# Patient Record
Sex: FEMALE | Race: WHITE | NOT HISPANIC OR LATINO | Employment: FULL TIME | URBAN - METROPOLITAN AREA
[De-identification: names, ages, dates, MRNs, and addresses within clinical notes are randomized per-mention and may not be internally consistent; named-entity substitution may affect disease eponyms.]

---

## 2018-04-27 ENCOUNTER — OFFICE VISIT - HEALTHEAST (OUTPATIENT)
Dept: FAMILY MEDICINE | Facility: CLINIC | Age: 63
End: 2018-04-27

## 2018-04-27 DIAGNOSIS — R10.2 PELVIC PAIN: ICD-10-CM

## 2018-04-27 LAB
ALBUMIN UR-MCNC: NEGATIVE MG/DL
APPEARANCE UR: CLEAR
BACTERIA #/AREA URNS HPF: ABNORMAL HPF
BILIRUB UR QL STRIP: NEGATIVE
CLUE CELLS: NORMAL
COLOR UR AUTO: YELLOW
GLUCOSE UR STRIP-MCNC: NEGATIVE MG/DL
HGB UR QL STRIP: NEGATIVE
KETONES UR STRIP-MCNC: NEGATIVE MG/DL
LEUKOCYTE ESTERASE UR QL STRIP: ABNORMAL
NITRATE UR QL: NEGATIVE
PH UR STRIP: 6 [PH] (ref 5–8)
RBC #/AREA URNS AUTO: ABNORMAL HPF
SP GR UR STRIP: <=1.005 (ref 1–1.03)
SQUAMOUS #/AREA URNS AUTO: ABNORMAL LPF
TRICHOMONAS, WET PREP: NORMAL
UROBILINOGEN UR STRIP-ACNC: ABNORMAL
WBC #/AREA URNS AUTO: ABNORMAL HPF
YEAST, WET PREP: NORMAL

## 2018-04-28 LAB — BACTERIA SPEC CULT: NO GROWTH

## 2018-05-02 ENCOUNTER — OFFICE VISIT (OUTPATIENT)
Dept: OBGYN | Facility: CLINIC | Age: 63
End: 2018-05-02
Payer: COMMERCIAL

## 2018-05-02 VITALS
BODY MASS INDEX: 22.3 KG/M2 | TEMPERATURE: 98.7 F | WEIGHT: 134 LBS | DIASTOLIC BLOOD PRESSURE: 80 MMHG | SYSTOLIC BLOOD PRESSURE: 146 MMHG | HEART RATE: 69 BPM

## 2018-05-02 DIAGNOSIS — Z12.4 CERVICAL CANCER SCREENING: ICD-10-CM

## 2018-05-02 DIAGNOSIS — N84.1 CERVICAL POLYP: Primary | ICD-10-CM

## 2018-05-02 DIAGNOSIS — R10.2 PELVIC PAIN IN FEMALE: ICD-10-CM

## 2018-05-02 DIAGNOSIS — Z12.31 ENCOUNTER FOR SCREENING MAMMOGRAM FOR BREAST CANCER: ICD-10-CM

## 2018-05-02 PROCEDURE — 99203 OFFICE O/P NEW LOW 30 MIN: CPT | Mod: 25 | Performed by: OBSTETRICS & GYNECOLOGY

## 2018-05-02 PROCEDURE — 57500 BIOPSY OF CERVIX: CPT | Performed by: OBSTETRICS & GYNECOLOGY

## 2018-05-02 PROCEDURE — G0145 SCR C/V CYTO,THINLAYER,RESCR: HCPCS | Performed by: OBSTETRICS & GYNECOLOGY

## 2018-05-02 PROCEDURE — 88305 TISSUE EXAM BY PATHOLOGIST: CPT | Performed by: OBSTETRICS & GYNECOLOGY

## 2018-05-02 PROCEDURE — 87624 HPV HI-RISK TYP POOLED RSLT: CPT | Performed by: OBSTETRICS & GYNECOLOGY

## 2018-05-02 NOTE — PROGRESS NOTES
GYN Clinic Visit    Date of visit: 2018     Chief Complaint: pelvic pain    HPI:   Nicci Rinaldi is a 62 year female who presents to clinic today in consultation for pelvic pain and a cervical polyp as a referral from an outside urgent care.    Reports she's noticed some lower abdominal discomfort over the past few weeks/month.  It goes across her lower abdomen just below the umbilicus, and sometimes extends above the umbilicus.  States it's not constant, but isn't sure what makes it better or worse.  She does notice it more when she's falling asleep.  She describes it as a burning/itching/irritating sensation.      She is active, teaching, doing yoga and swimming, but these activites don't seem to have any impact.  Doesn't seem to be associated with eating.    Has regular bowel movements.  No urinary symptoms.    Vaginal discharge: denies  History of STIs: denies  Menses:  No LMP recorded. Patient is postmenopausal.   Sexually active: yes, consistent partner   Pain with intercourse: denies  Contraception: n/a   Constipation: denies  Urinary symptoms: negative  Previous imaging: none    Patient additionally has following concerns/questions today: was told she had a cervical polyp during exam in UC.    Medications:  Current Outpatient Prescriptions   Medication     aspirin 10 mg/mL SUSP     Cholecalciferol (VITAMIN D PO)     Cyanocobalamin (VITAMIN B-12 PO)     Fish Oil-Cholecalciferol (FISH OIL + D3 PO)     MetroNIDazole (METROCREAM) 0.75 % cream     Multiple Vitamin (DAILY MULTIVITAMIN PO)     naproxen (NAPROSYN) 250 MG tablet     tretinoin (RETIN-A) 0.1 % cream     No current facility-administered medications for this visit.        Allergy:  No Known Allergies  Patient denies food, latex or environmental allergies.     Obstetric History:   No obstetric history on file.   Obstetric History       T2      L2     SAB1   TAB0   Ectopic0   Multiple0   Live Births2       # Outcome Date GA Lbr  "Edmond/2nd Weight Sex Delivery Anes PTL Lv   3 SAB      SAB      2 Term         MARICHUY   1 Term         MARICHUY          Gynecologic History:  Menarche:   No LMP recorded. Patient is postmenopausal.  Menopause around age 56.  STI history: denies  Last Pap: \"many years ago\"  History of abnormal pap: denies  History of cervical procedures: denies     Past Medical History:  Past Medical History:   Diagnosis Date     Adenomatous polyp of colon        Past Surgical History:  Past Surgical History:   Procedure Laterality Date     GYN SURGERY      tubal ligation     REPAIR PTOSIS BILATERAL  2013    Procedure: REPAIR PTOSIS BILATERAL;  BILATERAL UPPER LID PTOSIS AND MECHANICAL PTOSIS REPAIR ;  Surgeon: Mikel Hartley MD;  Location: [facility]       Social History:  Tobacco use: denies  Alcohol use: social  Recreational drug use: denies  Relationship status is: .    Lives with   Employment: teacher  History of sexual, physical or mental abuse denies.   She feels safe in her current relationship.    History reviewed. No pertinent family history.  Denies hx of Breast, ovarian, or colon cancer, sudden death, early cardiovascular or thomboembolic disease. Denies hx or congenital anomalies and autoimmune diseases.    Review of Systems:  Review of Systems  Gen: no change in weight, no fever, no chills, no fatigue  CV: no palpitations, no chest pain, no hypertension, no syncope  Resp: no shortness of breath, no cough, no wheezing, no asthma  GI: no nausea, no vomiting, no diarrhea, no constipation, no bloating, no GERD  : +pelvic pain as above,  no vaginal discharge, no dysuria, no abnormal bleeding  Endo: no thyroid problems, no cold/heat intolerance, no acne, no hirsutism, no diabetes  Heme: no easy bruising or bleeding, no history of DVT/PE/CVA  Neuro: no headaches, no seizures, no strokes, no focal deficits      Physical Exam:  Vitals:    18 1308   BP: 146/80   Pulse: 69   Temp: 98.7  F (37.1  C) "   TempSrc: Oral   Weight: 134 lb (60.8 kg)     Body mass index is 22.3 kg/(m^2).    Gen: NAD, Awake and alert, cooperative with exam  Neuro:  Strength, sensation grossly intact  Psych:  Appropriate mood and affect  Abd: soft, nontender, nondistended, no rebound, no guarding.  Normoactive BS  Extremities: nontender, no edema  : normal appearing external genitalia, no lesions or abnormalities. Well supported urethra, normal Skenes and Bartholins. Intact normal appearing vaginal mucosa without lesions or abnormal discharge. Cervix appears WNL with 3mm polyp extending from cervical os.  Pap smear was obtained. Bimanual exam reveals normal size uterus, no palpable uterine or adnexal masses.    Assessment:  Nicci Rinaldi is a 62 year old  who presents in consultation for pelvic pain and cervical polyp.     Plan:  Cervical polyp removed without complication.  Pap smear obtained.    Exam benign.  Abdominal pain seems non-gynecologic in origin.  She was encouraged to see a primary care provider if the pain continues.    Order placed for mammogram.  Patient given scheduling info and was encouraged to schedule.  Reports she's got her last colonoscopy at outside facility and isn't sure that she's due yet.  She was encouraged to contact that facility to get her records and find out when she's due.    Georgia Colindres MD    20 min was spent with this patient that does not include time spent on the procedure and over 50 % of the 20 min was spent counseling on cervical polyps and their implications, as well as the importance of routine screening examinations (pap smear, mammogram, colonoscopy).

## 2018-05-02 NOTE — MR AVS SNAPSHOT
"              After Visit Summary   2018    Nicci Rinaldi    MRN: [id number]           Patient Information     Date Of Birth          1955        Visit Information        Provider Department      2018 1:00 PM Georgia Colindres MD [facility]        Today's Diagnoses     Cervical polyp    -  1    Pelvic pain in female        Encounter for screening mammogram for breast cancer        Cervical cancer screening           Follow-ups after your visit        Who to contact     If you have questions or need follow up information about today's clinic visit or your schedule please contact [facility] directly at [phone].  Normal or non-critical lab and imaging results will be communicated to you by MyChart, letter or phone within 4 business days after the clinic has received the results. If you do not hear from us within 7 days, please contact the clinic through [employer] or phone. If you have a critical or abnormal lab result, we will notify you by phone as soon as possible.  Submit refill requests through [employer] or call your pharmacy and they will forward the refill request to us. Please allow 3 business days for your refill to be completed.          Additional Information About Your Visit        MyChart Information     [employer] lets you send messages to your doctor, view your test results, renew your prescriptions, schedule appointments and more. To sign up, go to [email or web address] . Click on \"Log in\" on the left side of the screen, which will take you to the Welcome page. Then click on \"Sign up Now\" on the right side of the page.     You will be asked to enter the access code listed below, as well as some personal information. Please follow the directions to create your username and password.     Your access code is: [email or web address]  Expires: 2018  8:05 PM     Your access code will  in 90 days. If you need help or a new code, please call your [place] " clinic or [phone].        Care EveryWhere ID     This is your Care EveryWhere ID. This could be used by other organizations to access your [place] medical records  [id number]        Your Vitals Were     Pulse Temperature BMI (Body Mass Index)             69 98.7  F (37.1  C) (Oral) 22.3 kg/m2          Blood Pressure from Last 3 Encounters:   05/02/18 146/80   12/23/13 112/70    Weight from Last 3 Encounters:   05/02/18 134 lb (60.8 kg)   12/23/13 129 lb (58.5 kg)              We Performed the Following     BIOPSY/EXCIS CERVICAL LESION W/WO FULGURATION     Pap imaged thin layer screen with HPV - recommended age 30 - 65 years (select HPV order below)     Surgical pathology exam        Primary Care Provider Office Phone # Fax #    Gee THERESA Pratt [phone]       [place] [address]        Equal Access to Services     ROSA RUDOLPH : Hadii lucy ku hadasho Soomaali, waaxda luqadaha, qaybta kaalmada adeegyada, katty dash . So [facility] [phone].    ATENCIÓN: Si habla español, tiene a lewis disposición servicios gratuitos de asistencia lingüística. Llame al [phone].    We comply with applicable federal civil rights laws and [place] laws. We do not discriminate on the basis of race, color, national origin, age, disability, sex, sexual orientation, or gender identity.            Thank you!     Thank you for choosing [facility]  for your care. Our goal is always to provide you with excellent care. Hearing back from our patients is one way we can continue to improve our services. Please take a few minutes to complete the written survey that you may receive in the mail after your visit with us. Thank you!             Your Updated Medication List - Protect others around you: Learn how to safely use, store and throw away your medicines at www.disposemymeds.org.          This list is accurate as of 5/2/18 11:59  PM.  Always use your most recent med list.                   Brand Name Dispense Instructions for use Diagnosis    aspirin 10 mg/mL Susp      Take 81 mg by mouth daily        DAILY MULTIVITAMIN PO      Take  by mouth daily.        FISH OIL + D3 PO           metroNIDAZOLE 0.75 % cream    METROCREAM    45 g    Apply  topically. qam to entire face    Acne rosacea       naproxen 250 MG tablet    NAPROSYN     Take 250 mg by mouth daily as needed.        tretinoin 0.1 % cream    RETIN-A    45 g    Apply  topically At Bedtime. Use pea-size amount for entire face, use qohs or every 3rd night    Milia, [place]one       VITAMIN B-12 PO      Take  by mouth.        VITAMIN D PO      Take  by mouth.

## 2018-05-02 NOTE — LETTER
May 14, 2018    Nicci Rinaldi  [address] [address]    Dear Nicci,  We are happy to inform you that your PAP smear result from 05/02/18 is normal.  We are now able to do a follow up test on PAP smears. The DNA test is for HPV (Human Papilloma Virus). Cervical cancer is closely linked with certain types of HPV. Your results showed no evidence of high risk HPV.  Therefore we recommend you return in 3 years for your next pap smear.  You will still need to return to the clinic every year for an annual exam and other preventive tests.  Please contact the clinic at [phone] with any questions.  Sincerely,    Georgia Colindres MD/[facility]

## 2018-05-02 NOTE — LETTER
"May 15, 2018      Nicci Tapia  [address] [address]        Dear ,    We are writing to inform you of your test results.    The pathology on your polyp was normal and no further follow-up is needed.     Resulted Orders   Surgical pathology exam   Result Value Ref Range    Copath Report       Patient Name: NICCI TAPIA  MR#: [id number]  Specimen #: [id number]  Collected: 5/2/2018  Received: 5/3/2018  Reported: 5/6/2018 17:06  Ordering Phy(s): KEVEN ROLLE    For improved result formatting, select 'View Enhanced Report Format' under   Linked Documents section.    SPECIMEN(S):  Cervical polyp    FINAL DIAGNOSIS:  Cervical polyp, resection:  - Benign endocervical polyp with a few nabothian cysts.    Electronically signed out by:    Alexandre Calhoun M.D.    CLINICAL HISTORY:  62 year old female with cervical polyp and recent pap smear [id number] on   5/2/2018 pending.    GROSS:  A single specimen container with formalin is received labeled with the   patient's name, date of birth, and  medical record number. Information on the requisition slip, container, and   associated labels is confirmed.    The specimen is designated \"cervical polyp\" consisting of a 1.2 x 1 x 0.4   cm pink polyp.  The polyp is  bisected to reveal gelatinous, translucent material.  The specimen is    entirely submitted in one cassette.  (Dictated by: Marisabel Elizondo 5/3/2018 02:19 PM)    MICROSCOPIC:  Microscopic examination is performed.    CPT Codes:  A: 09346-VC2    TESTING LAB LOCATION:  [address] [address] [phone]    COLLECTION SITE:  Client: [facility]  Location: [facility] (B)     Pap imaged thin layer screen with HPV - recommended age 30 - 65 years (select HPV order below)   Result Value Ref Range    PAP NIL     Copath Report         Patient Name: NICCI TAPIA  MR#: " [id number]  Specimen #: [id number]  Collected: 5/2/2018  Received: 5/3/2018  Reported: 5/8/2018 09:55  Ordering Phy(s): KEVEN ROLLE    For improved result formatting, select 'View Enhanced Report Format' under   Linked Documents section.    SPECIMEN/STAIN PROCESS:  Pap imaged thin layer prep screening (Surepath, FocalPoint with guided   screening)       Pap-Cyto x 2, HPV ordered x 1    SOURCE: Cervical, endocervical  ----------------------------------------------------------------   Pap imaged thin layer prep screening (Surepath, FocalPoint with guided   screening)  SPECIMEN ADEQUACY:  Satisfactory for evaluation.  -Transitional zone component could not be determined due to atrophy.    CYTOLOGIC INTERPRETATION:    Negative for intraepithelial lesion or malignancy    Electronically signed out by:  JENNIFER Hyde (ASCP)    Processed and screened at [facility]    CLINICAL HIST ORY:    Post Menopausal,    Papanicolaou Test Limitations:  Cervical cytology is a screening test with   limited sensitivity; regular  screening is critical for cancer prevention; Pap tests are primarily   effective for the diagnosis/prevention of  squamous cell carcinoma, not adenocarcinomas or other cancers.    TESTING LAB LOCATION:  [address]  [phone]    COLLECTION SITE:  Client:  [facility]  Location: [facility] ([facility])     HPV High Risk Types DNA Cervical   Result Value Ref Range    HPV Source SurePath     HPV 16 DNA Negative NEG^Negative    HPV 18 DNA Negative NEG^Negative    Other HR HPV Negative NEG^Negative    Final Diagnosis This patient's sample is negative for HPV DNA.       Comment:      This test was developed and its performance characteristics determined by the   [facility], [facility]. It   has not been  cleared or approved by the FDA. The laboratory is regulated under   CLIA as qualified to perform high-complexity testing. This test is used for   clinical purposes. It should not be regarded as investigational or for   research.  (Note)  METHODOLOGY:  The Roche yoon 4800 system uses automated extraction,   simultaneous amplification of HPV (L1 region) and beta-globin,    followed by  real time detection of fluorescent labeled HPV and beta   globin using specific oligonucleotide probes . The test specifically   identifies types HPV 16 DNA and HPV 18 DNA while concurrently   detecting the rest of the high risk types (31, 33, 35, 39, 45, 51,   52, 56, 58, 59, 66 or 68).  COMMENTS:  This test is not intended for use as a screening device   for women under age 30 with normal cervical cytology.  Results should   be correl  ated with cytologic and histologic findings. Close clinical   followup is recommended.      Specimen Description Cervical Cells       Comment:      [id number]       If you have any questions or concerns, please call the clinic at the number listed above.       Sincerely,        Georgia Colindres MD

## 2018-05-02 NOTE — NURSING NOTE
"Chief Complaint   Patient presents with     RECHECK     cervical polp       Initial /80  Pulse 69  Temp 98.7  F (37.1  C) (Oral)  Wt 134 lb (60.8 kg)  BMI 22.3 kg/m2 Estimated body mass index is 22.3 kg/(m^2) as calculated from the following:    Height as of 12/23/13: 5' 5\" (1.651 m).    Weight as of this encounter: 134 lb (60.8 kg).  BP completed using cuff size: regular    No obstetric history on file.    The following HM Due: pap smear      The following patient reported/Care Every where data was sent to:  P ABSTRACT QUALITY INITIATIVES [[id number]]  VIOLETTE Pardo                 "

## 2018-05-06 LAB — COPATH REPORT: NORMAL

## 2018-05-08 LAB
COPATH REPORT: NORMAL
PAP: NORMAL

## 2018-05-08 NOTE — PROCEDURES
PROCEDURE  Procedure: Cervical polypectomy  Indication: cervical polyp  After discussion with the patient I recommended that the patient proceed with removal of cervical polyp.  The risks and benefits were discussed with the patient in detail. She understood the risks including infection and bleeding.  The patient was agreeable to proceed with polyp removal and written consent was obtained.   On speculum exam, the patient no abnormalities in her vaginal epithelium. Her cervix was easily visualized and a 3mm polyp was seen in the cervical os.  The polyp was grasped with a ring forceps and twisted until it was removed.  There was no bleeding after removal.    The patient tolerated the procedure without difficulty. No complications occurred during the biopsy.     Georgia Colindres MD

## 2018-05-09 LAB
FINAL DIAGNOSIS: NORMAL
HPV HR 12 DNA CVX QL NAA+PROBE: NEGATIVE
HPV16 DNA SPEC QL NAA+PROBE: NEGATIVE
HPV18 DNA SPEC QL NAA+PROBE: NEGATIVE
SPECIMEN DESCRIPTION: NORMAL
SPECIMEN SOURCE CVX/VAG CYTO: NORMAL

## 2019-11-19 ENCOUNTER — OFFICE VISIT - HEALTHEAST (OUTPATIENT)
Dept: FAMILY MEDICINE | Facility: CLINIC | Age: 64
End: 2019-11-19

## 2019-11-19 DIAGNOSIS — T88.1XXA LOCAL REACTION TO IMMUNIZATION, INITIAL ENCOUNTER: ICD-10-CM

## 2021-06-01 VITALS — WEIGHT: 134 LBS | BODY MASS INDEX: 22.3 KG/M2

## 2021-06-04 VITALS
OXYGEN SATURATION: 99 % | DIASTOLIC BLOOD PRESSURE: 64 MMHG | SYSTOLIC BLOOD PRESSURE: 122 MMHG | TEMPERATURE: 98.2 F | HEART RATE: 67 BPM | RESPIRATION RATE: 16 BRPM

## 2021-06-17 NOTE — PROGRESS NOTES
ASSESSMENT:   1. Pelvic pain  Urinalysis-UC if Indicated    Culture, Urine    Wet Prep, Vaginal       PLAN:  21-year-old female presents for evaluation of pelvic pain.  Been present for 1 month.  Not associated with dysuria, not associated with vaginal discharge or bleeding.  Exam today reveals a small mass through the cervical os.  Also noted excoriation of the cervix.  There is no adnexal tenderness, I do not appreciate an adnexal mass.  There is no abdominal tenderness to palpation.  UA is obtained and is not convincing for infection, however there are a few bacteria present so we will follow-up with culture.  Do not feel patient needs to start antibiotic treatment for this without urinary symptoms.  She will be contacted if urine culture results necessitate treatment.  Did obtain a wet prep as well, this does result is negative.  Patient is advised that she needs very close follow-up with gynecology.  She is provided with contact information for 2 separate clinics.  The importance of follow-up was stressed to her several times.  She will return to clinic or emergency department with severe worsening of pain, fevers, or any other concerns in the meantime.    I discussed red flag symptoms, return precautions to clinic/ER and follow up care with patient/parent.  Expected clinical course, symptomatic cares advised. Questions answered. Patient/parent amenable with plan.    Patient Instructions:  Patient Instructions   Your tests today are normal.  Your cervix exam is not normal today, and this needs close follow up with gynecology.  The [facility] can be reached at [phone].  Another option would be Dr. Keon Maguire at [facility] [phone].  Please make an appointment to be seen next week.      SUBJECTIVE:   Nicci Rinaldi is a 62 y.o. female who presents today with right sided pelvic pain for about one month.  Pain is intermittent.  Denies dysuria, does endorse some urinary frequency.  Denies hematuria.   "Denies fever.  No back pain.  No vaginal discharge or itching, but does have some tingling to the vaginal. No vaginal bleeding.  Has not had PAP in \"many years\".  Sexually active with same partner.  Denies vaginal lesions.  Previous tubal, no other abdominal surgeries.  No nausea, vomiting or diarrhea.  Has had more frequent BM over the last month, not loose.  Denies bloody stools or melena.  Notes pain is most prominent when her bladder is full.       ROS:  Comprehensive 12 pt ROS completed, positives noted in HPI, otherwise negative.      Past Medical History:  There is no problem list on file for this patient.      Surgical History:  No past surgical history on file.        Family History:  No family history on file.    Reviewed; Non-contributory    History   Smoking Status     Never Smoker   Smokeless Tobacco     Never Used       Current Medications:  No current outpatient prescriptions on file prior to visit.     No current facility-administered medications on file prior to visit.        Allergies:   No Known Allergies    OBJECTIVE:   Vitals:    04/27/18 1559   BP: 125/65   Pulse: 64   Resp: 15   Temp: 97  F (36.1  C)   TempSrc: Oral   SpO2: 98%   Weight: 134 lb (60.8 kg)     Physical exam reveals a pleasant 62 y.o. female.   Appears healthy, alert and cooperative. Non-toxic appearance.  Mouth:  Mucosa pink and moist.   Neck: normal  Lungs: Chest is clear, no wheezing, rhonchi or rales. Symmetric air entry throughout both lung fields.  Heart: regular rate and rhythm, no murmur, rub or gallop  Abdomen: soft, nontender. No masses or organomegaly  : Normal female external genitalia without lesions, rash, or erythema.  Vaginal mucosa pink and moist, rugae intact.  Vaginal discharge is scant.  Cervix with mild excoriation at the 12 o'clock position. There is an approximant 3cm reddened mass through the cervical os.  No cervical motion or adnexal tenderness.  Skin: pink, warm, dry, and without lesions on limited " skin exam. No rashes.     RADIOLOGY    none  LABORATORY STUDIES    Recent Results (from the past 24 hour(s))   Urinalysis-UC if Indicated   Result Value Ref Range    Color, UA Yellow Colorless, Yellow, Straw, Light Yellow    Clarity, UA Clear Clear    Glucose, UA Negative Negative    Bilirubin, UA Negative Negative    Ketones, UA Negative Negative    Specific Gravity, UA <=1.005 1.005 - 1.030    Blood, UA Negative Negative    pH, UA 6.0 5.0 - 8.0    Protein, UA Negative Negative mg/dL    Urobilinogen, UA 0.2 E.U./dL 0.2 E.U./dL, 1.0 E.U./dL    Nitrite, UA Negative Negative    Leukocytes, UA Trace (!) Negative    Bacteria, UA Few (!) None Seen hpf    RBC, UA None Seen None Seen, 0-2 hpf    WBC, UA 0-5 None Seen, 0-5 hpf    Squam Epithel, UA 0-5 None Seen, 0-5 lpf   Wet Prep, Vaginal   Result Value Ref Range    Yeast Result No yeast seen No yeast seen    Trichomonas No Trichomonas seen No Trichomonas seen    Clue Cells, Wet Prep No Clue cells seen No Clue cells seen           Glo Hernandez, CNP

## 2024-10-18 NOTE — PROGRESS NOTES
Chief Complaint   Patient presents with     Injection reaction     2nd Shingles injection 11/16. L/T arm-- large red spot.        ASSESSMENT & PLAN:   Diagnoses and all orders for this visit:    Local reaction to immunization, initial encounter        MDM:    Reviewed literature regarding site reactions with shingles vaccine.  It appears the median length of time for visible vaccine reaction on arm is 3 days.  Patient informed of this.  Advised to watch for expansion of erythema and return if this happens.  Patient does state that swelling is starting to go down.  Unlikely to be cellulitis as the reaction did start very soon after injection and this is rare.    Recheck in a few days if not getting better.    Supportive care discussed.  See discharge instructions below for specific recommendations given.    At the end of the encounter, I discussed results, diagnosis, medications. Discussed red flags for immediate return to clinic/ER, as well as indications for follow up if no improvement. Patient and/or caregiver understood and agreed to plan. Patient was stable for discharge.    SUBJECTIVE    HPI:  HPI  Nicci Rinaldi presents to the walk-in clinic with   Chief Complaint   Patient presents with     Injection reaction     2nd Shingles injection 11/16. L/T arm-- large red spot.      Swelling decreasing, but area of erythema about the same.      Symptoms started: Soon after injection    Denies: Other allergic symptoms such as throat swelling, redness of breath.    See ROS for additional symptoms and/or pertinent negatives.       History obtained from the patient.    No past medical history on file.    There are no active non-hospital problems to display for this patient.      No family history on file.    Social History     Tobacco Use     Smoking status: Never Smoker     Smokeless tobacco: Never Used   Substance Use Topics     Alcohol use: Not on file       Review of Systems    OBJECTIVE    Vitals:    11/19/19  Reason for call:   [x] Refill   [] Prior Auth  [] Other:     Office:   [x] PCP/Provider - Iris Gandhi PA-C / [facility]  [] Specialty/Provider -     Medication: clonazePAM (KlonoPIN) 0.5 mg tablet     Dose/Frequency:  Take 1 tablet (0.5 mg total) by mouth daily at bedtime     Quantity: 30    Pharmacy: [facility]/pharmacy #[id number] - [place], [place] - [address]     Does the patient have enough for 3 days?   [x] Yes   [] No - Send as HP to POD     1908 11/19/19 1926   BP: 148/76 122/64   Patient Site: Right Arm Right Arm   Patient Position: Sitting Sitting   Cuff Size: Adult Regular Adult Regular   Pulse: 67    Resp: 16    Temp: 98.2  F (36.8  C)    TempSrc: Oral    SpO2: 99%        Physical Exam  Constitutional:       Appearance: She is well-developed.   Eyes:      Conjunctiva/sclera: Conjunctivae normal.   Cardiovascular:      Pulses: Normal pulses.   Pulmonary:      Effort: Pulmonary effort is normal.   Musculoskeletal:      Comments: Normal gait    Skin:     General: Skin is warm and dry.      Capillary Refill: Capillary refill takes less than 2 seconds.      Findings: Erythema (Flat, nontender, non-warm area of erythema located inferior portion of deltoid muscle approximately 6 cm in diameter.) present.   Neurological:      General: No focal deficit present.      Mental Status: She is alert and oriented to person, place, and time.   Psychiatric:         Mood and Affect: Mood normal.         Behavior: Behavior normal.         Thought Content: Thought content normal.         Judgment: Judgment normal.         Labs:  No results found for this or any previous visit (from the past 240 hour(s)).      Radiology:    No results found.    PATIENT INSTRUCTIONS:   There are no Patient Instructions on file for this visit.